# Patient Record
(demographics unavailable — no encounter records)

---

## 2024-10-22 NOTE — CARDIOLOGY SUMMARY
[de-identified] : ECG today showed sinus rhythm with a ventricular rate of 80 bpm. RI interval 146 ms and QRS duration of 84 ms. Normal axis and intervals. QTc 441 ms. No chamber enlargement or hypertrophy. No ST/T changes. [de-identified] : TTE W or WO Ultrasound Enhancing Agent (03.23.24 @ 08:48) CONCLUSIONS:  1. Left ventricular systolic function is normal with an ejection fraction of 56 % by Eckert's method of disks with an ejection fraction visually estimated at 55 to 60 %.  2. Normal right ventricular cavity size, with normal wall thickness, and normal systolic function.  3. Normal left and right atrial size.  4. No significant valvular disease.  5. No echocardiographic evidence of pulmonary hypertension.  6. No pericardial effusion seen. 7. No prior echocardiogram is available for comparison. [de-identified] : Coronary Angiography 3/2024 The coronary circulation is right dominant. Cardiac catheterization was performed electively. LM  Left main artery: The segment is visually normal in size and structure. Angiography shows no disease.  LAD  Left anterior descending artery: The segment is visually normal in size and structure. Angiography shows no disease.  CX  Circumflex: The segment is visually normal in size and structure. Angiography shows no disease.  RCA  Right coronary artery: The segment is visually normal in size and structure. Angiography shows no disease.

## 2024-10-22 NOTE — REASON FOR VISIT
[Symptom and Test Evaluation] : symptom and test evaluation [FreeTextEntry3] : Lizbeth Chatman MD- [address] [id number] [FreeTextEntry1] : History was provided by patient and chart review.

## 2024-10-22 NOTE — HISTORY OF PRESENT ILLNESS
[FreeTextEntry1] : Mr. Mj Odom was seen at the [facility] located in [place], [place], on 10/21/2024. The patient is a 48-year-old man with, systemic hypertension, lipid disorder, diabetes, premature coronary artery disease in a first-degree relative, SVT and palpitations/shortness of breath. He was referred specifically for these symptoms.   I had the opportunity of meeting Mj for the first time today in clinic. The patient was unaccompanied during the visit.   Mj was admitted to [facility] in 3/2024 with sudden onset chest pain/palpitations when he awoke from sleep at 0400, upon arrival in the ER EKG noted SVT  given IV adenosine and vagal manuevers converted to sinus tachycardia. 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 He was taken to the cath lab. Normal coronary arteries. Echo LVEF 55-60%. He was not sent home on an AV suleman blocker.   He denies fevers, chills, night sweats, fatigue, snoring, headache, vision disturbance, near syncope, syncope, lightheadedness, dysphagia, chest pressure, pedema, orthopnea, paroxysmal nocturnal dyspnea, edema, transient ischemic attack, stroke, bleeding issues, or mechanical falls. He continues to have palpitations and shortness of breath. He feels his heart racing multiple times per day.    No personal history of coronary artery disease, heart failure, myocarditis, pericarditis or valvular disease.   Other past medical and surgical history listed below.  Exercise: no

## 2024-10-22 NOTE — DISCUSSION/SUMMARY
[FreeTextEntry1] : Mr. Mj Odom is a 48-year-old man with 1. Systemic hypertension 2. Lipid disorder 3. Diabetes 4. Premature coronary artery disease in a first-degree relative 5. Prior SVT.  6. Palpitations.  7. Shortness of breath.   He was referred specifically for these symptoms of shortness of breath and palpitations. He has no angina. He has no evidence of acute decompensated HF.   Given he had a normal coronary angiogram in 3/2024, it would be futile to order a coronary CTA at this time. I do want to repeat an echocardiogram to assess his valves and pericardium.   I also want to refer him to pulmonary medicine and CPET as well.   I think a 7 day heart monitor is warranted to ensure there is no recurrence of his SVT that could explain his palpitations and shortness of breath.   If there are no abnormalities on the echo or heart monitor (and if there is no pulmonary concern) then we will have to consider additional tests for workup of his symptoms.   I recommended lifestyle modifications necessary for long-term cardiovascular health.    The patient is aware of alarm cardiac type symptoms, will call with questions or concerns and is aware to activate 9-1-1 and/or present to the closest emergency department if concerns arise.   I recommend follow up with me in 3 months. I will follow up on the echo and CPET.    The patient should continue to follow with their primary care physician. All questions were answered to the patient's satisfaction. Return precautions were given and the patient verbalized understanding, and is agreeable with plan of care. Thank you for the opportunity to participate in the care of Mj. Please do not hesitate to reach out to me with questions or concerns.   Duane Bynum DO, [facility] Cardio-Obstetrics Cardiologist Adult Congenital Heart Disease Cardiologist [facility], [address], [address] [place] [address] [EKG obtained to assist in diagnosis and management of assessed problem(s)] : EKG obtained to assist in diagnosis and management of assessed problem(s)

## 2024-10-22 NOTE — REVIEW OF SYSTEMS
[SOB] : shortness of breath [Palpitations] : palpitations [Depression] : no depression [Suicidal] : not suicidal [Negative] : Heme/Lymph

## 2024-10-22 NOTE — REASON FOR VISIT
[Symptom and Test Evaluation] : symptom and test evaluation [FreeTextEntry3] : Lizbeth Chatman MD- [address] [phone] [FreeTextEntry1] : History was provided by patient and chart review.

## 2024-10-22 NOTE — DISCUSSION/SUMMARY
[FreeTextEntry1] : Mr. Mj Odom is a 48-year-old man with 1. Systemic hypertension 2. Lipid disorder 3. Diabetes 4. Premature coronary artery disease in a first-degree relative 5. Prior SVT.  6. Palpitations.  7. Shortness of breath.   He was referred specifically for these symptoms of shortness of breath and palpitations. He has no angina. He has no evidence of acute decompensated HF.   Given he had a normal coronary angiogram in 3/2024, it would be futile to order a coronary CTA at this time. I do want to repeat an echocardiogram to assess his valves and pericardium.   I also want to refer him to pulmonary medicine and CPET as well.   I think a 7 day heart monitor is warranted to ensure there is no recurrence of his SVT that could explain his palpitations and shortness of breath.   If there are no abnormalities on the echo or heart monitor (and if there is no pulmonary concern) then we will have to consider additional tests for workup of his symptoms.   I recommended lifestyle modifications necessary for long-term cardiovascular health.    The patient is aware of alarm cardiac type symptoms, will call with questions or concerns and is aware to activate 9-1-1 and/or present to the closest emergency department if concerns arise.   I recommend follow up with me in 3 months. I will follow up on the echo and CPET.    The patient should continue to follow with their primary care physician. All questions were answered to the patient's satisfaction. Return precautions were given and the patient verbalized understanding, and is agreeable with plan of care. Thank you for the opportunity to participate in the care of Mj. Please do not hesitate to reach out to me with questions or concerns.   Duane Bynum DO, [facility] Cardio-Obstetrics Cardiologist Adult Congenital Heart Disease Cardiologist [facility], [address], [address] [place] [address] [EKG obtained to assist in diagnosis and management of assessed problem(s)] : EKG obtained to assist in diagnosis and management of assessed problem(s)

## 2024-10-22 NOTE — CARDIOLOGY SUMMARY
[de-identified] : ECG today showed sinus rhythm with a ventricular rate of 80 bpm. [place] interval 146 ms and QRS duration of 84 ms. Normal axis and intervals. QTc 441 ms. No chamber enlargement or hypertrophy. No ST/T changes. [de-identified] : TTE W or WO Ultrasound Enhancing Agent (03.23.24 @ 08:48) CONCLUSIONS:  1. Left ventricular systolic function is normal with an ejection fraction of 56 % by Eckert's method of disks with an ejection fraction visually estimated at 55 to 60 %.  2. Normal right ventricular cavity size, with normal wall thickness, and normal systolic function.  3. Normal left and right atrial size.  4. No significant valvular disease.  5. No echocardiographic evidence of pulmonary hypertension.  6. No pericardial effusion seen. 7. No prior echocardiogram is available for comparison. [de-identified] : Coronary Angiography 3/2024 The coronary circulation is right dominant. Cardiac catheterization was performed electively. LM  Left main artery: The segment is visually normal in size and structure. Angiography shows no disease.  LAD  Left anterior descending artery: The segment is visually normal in size and structure. Angiography shows no disease.  CX  Circumflex: The segment is visually normal in size and structure. Angiography shows no disease.  RCA  Right coronary artery: The segment is visually normal in size and structure. Angiography shows no disease.

## 2024-10-22 NOTE — HISTORY OF PRESENT ILLNESS
[FreeTextEntry1] : Mr. Mj Odom was seen at the [facility] located in [place], [place], on 10/21/2024. The patient is a 48-year-old man with, systemic hypertension, lipid disorder, diabetes, premature coronary artery disease in a first-degree relative, SVT and palpitations/shortness of breath. He was referred specifically for these symptoms.   I had the opportunity of meeting Mj for the first time today in clinic. The patient was unaccompanied during the visit.   Mj was admitted to [facility] in 3/2024 with sudden onset chest pain/palpitations when he awoke from sleep at 0400, upon arrival in the ER EKG noted SVT  given IV adenosine and vagal manuevers converted to sinus tachycardia. 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 He was taken to the cath lab. Normal coronary arteries. Echo LVEF 55-60%. He was not sent home on an AV suleman blocker.   He denies fevers, chills, night sweats, fatigue, snoring, headache, vision disturbance, near syncope, syncope, lightheadedness, dysphagia, chest pressure, pedema, orthopnea, paroxysmal nocturnal dyspnea, edema, transient ischemic attack, stroke, bleeding issues, or mechanical falls. He continues to have palpitations and shortness of breath. He feels his heart racing multiple times per day.    No personal history of coronary artery disease, heart failure, myocarditis, pericarditis or valvular disease.   Other past medical and surgical history listed below.  Exercise: no

## 2024-11-25 NOTE — ASSESSMENT
[FreeTextEntry1] : 47 yo man referred by cardiologists for evaluation of pulmonary status Primar is Dr Chatman  Translation by Christofer Known HT on valsartan, metoprolol, HLD on atorvastatin Diabetes on Farziga and metformin  He notes that he sometimes has difficulty taking a deep breath--however he does not appear to by dyspnea  Works as cook, unmarried He is a nonsmoker, used cocaine briefly 20+ years ago, no IVDA He does not exercise ****** The patient had been drinking 6-8 beers a day for many years , he has stopped within the past two months  Admitted to [facility] in [place] for SVT Cath at that time showed no evidence of CAD  Imp 47 yo man with history of HT, Diabetes and SVT in March 2024 Fairly heavy alcohol use until two months ago Nonsmoker with no pulmonary disease Suspect that tachycardia episode may have been related to alcohol abuse Doubt underlying pulmonary disease but will obtain full PFTs and blood work Return at time of his PFTs

## 2024-11-25 NOTE — HISTORY OF PRESENT ILLNESS
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

## 2025-01-13 NOTE — ASSESSMENT
[FreeTextEntry1] : 49 yo man with history of HT, Diabetes and SVT in March 2024 Fairly heavy alcohol use until two months ago Nonsmoker with no pulmonary disease Suspect that tachycardia episode may have been related to alcohol abuse Doubted underlying pulmonary disease but will obtain full PFTs and blood work  Interim: PAtient doing well No complaints of dyspena Blood work reviewed and it was noncontributory  PFTs today: No evidence of obstruction No evidence of restricted lung volumes or decrease in DLCO Normal PFTs  Imp 49 yo man with HT Diabetes and SVT and previous alcohol use Normal PFTs No further dyspnea No evidence of underlying pulmonary disease Will see again as requested

## 2025-01-13 NOTE — CONSULT LETTER
[Dear  ___] : Dear  [unfilled], [FreeTextEntry1] : I had the pleasure of evaluating your patient, SHARON HOLT , in the office today.  Please review my consultation and evaluation report that follows below.  Please do not hesitate to call me if further information is necessary or if you wish to discuss ongoing care or diagnostic work-up.    I very much appreciate your referral and it is a privilege to be able to provide care for your patient.  Sincerely,   Artem Narvaez MD, MHCM, FACP, ANA-C Pulmonary Medicine  of Medicine Mario abdulaziz Pedraza [facility] [employer] at \[facility]\""/[facility] [email or web address] -Pulmonary in [address] [place]  [address]    Fax   [facility] at [address]  [phone]

## 2025-01-13 NOTE — HISTORY OF PRESENT ILLNESS
[TextBox_4] : 49 yo man with history of HT, Diabetes and SVT in March 2024 Fairly heavy alcohol use until two months ago Nonsmoker with no pulmonary disease Suspect that tachycardia episode may have been related to alcohol abuse Doubted underlying pulmonary disease but will obtain full PFTs and blood work  Interim: PAtient doing well No complaints of dyspena Blood work reviewed and it was noncontributory  PFTs today: No evidence of obstruction No evidence of restricted lung volumes or decrease in DLCO Normal PFTs

## 2025-03-18 NOTE — PHYSICAL EXAM
[Well Developed] : well developed [Well Nourished] : well nourished [No Acute Distress] : no acute distress [Normal Conjunctiva] : normal conjunctiva [Normal Venous Pressure] : normal venous pressure [No Carotid Bruit] : no carotid bruit [Normal S1, S2] : normal S1, S2 [No Murmur] : no murmur [No Rub] : no rub [No Gallop] : no gallop [Clear Lung Fields] : clear lung fields [Good Air Entry] : good air entry [No Respiratory Distress] : no respiratory distress  [Soft] : abdomen soft [Non Tender] : non-tender [No Masses/organomegaly] : no masses/organomegaly [Normal Bowel Sounds] : normal bowel sounds [Normal Gait] : normal gait [No Edema] : no edema [No Cyanosis] : no cyanosis [No Clubbing] : no clubbing [No Varicosities] : no varicosities [No Rash] : no rash [No Skin Lesions] : no skin lesions [Moves all extremities] : moves all extremities [No Focal Deficits] : no focal deficits [Normal Speech] : normal speech [Alert and Oriented] : alert and oriented [Normal memory] : normal memory [Normal Rate] : normal [Rhythm Regular] : regular

## 2025-03-18 NOTE — CARDIOLOGY SUMMARY
[de-identified] : 3/18/2025 sinus rhythm with ventricular rate of 98 bpm. PA interval 144 ms and QRS duration of 81 ms. Normal axis and intervals. QTc 428 ms. No chamber enlargement or hypertrophy. No ST/T changes. [de-identified] : Notified that his heart monitor from 10.2024 never made it to my in basket. Patient had a min HR of 56 bpm, max HR of 155 bpm, and avg HR of 88 bpm. Predominant underlying rhythm was Sinus Rhythm. Isolated SVEs were rare (<1.0%), and no SVE Couplets or SVE Triplets were present. No Isolated VEs, VE Couplets, or VE Triplets were present. [de-identified] : TTE 03.23.24  1. Left ventricular systolic function is normal with an ejection fraction of 56 % by Eckert's method of disks with an ejection fraction visually estimated at 55 to 60 %.  2. Normal right ventricular cavity size, with normal wall thickness, and normal systolic function.  3. Normal left and right atrial size.  4. No significant valvular disease.  5. No echocardiographic evidence of pulmonary hypertension.  6. No pericardial effusion seen. 7. No prior echocardiogram is available for comparison.   [de-identified] : Coronary Angiography 3/2024 The coronary circulation is right dominant. Cardiac catheterization was performed electively. Left main artery: The segment is visually normal in size and structure. Angiography shows no disease. Left anterior descending artery: The segment is visually normal in size and structure. Angiography shows no disease. Circumflex: The segment is visually normal in size and structure. Angiography shows no disease. Right coronary artery: The segment is visually normal in size and structure. Angiography shows no disease.

## 2025-03-18 NOTE — REASON FOR VISIT
[CV Risk Factors and Non-Cardiac Disease] : CV risk factors and non-cardiac disease [FreeTextEntry3] : Lizbeth Chatman [FreeTextEntry1] : History was provided by patient and chart review. He was alone during the encounter.

## 2025-03-18 NOTE — HISTORY OF PRESENT ILLNESS
[FreeTextEntry1] : Mr. Mj Odom was seen at the [facility] located in [place], [place], on 3/18/2025. The patient is a 49-year-old man with systemic hypertension, lipid disorder, diabetes, premature coronary artery disease in a first-degree relative, SVT (AVNRT) and palpitations/shortness of breath. I last evaluated him on 10/21/2024. I recommended 3 month follow up with CPET and echo. I also referred him to pulmonary medicine. He was seen by Dr. Narvaez. He reported there were no pulmonary concerns.  Patient denies fevers, fatigue, snoring, lightheadedness, near syncope, syncope, headache, vision disturbance, chest pain, palpitations, dyspnea, edema, orthopnea, or PND. Family history: see below Social history: see below. He is a cook.  CV meds: ezetimibe 10 mg daily, olmesartan 5 mg daily

## 2025-03-18 NOTE — DISCUSSION/SUMMARY
[EKG obtained to assist in diagnosis and management of assessed problem(s)] : EKG obtained to assist in diagnosis and management of assessed problem(s) [FreeTextEntry1] : Mr. Mj Odom is a 49-year-old man with 1. Systemic hypertension. 2. Lipid disorder. 3. Diabetes. 4. Premature coronary artery disease in a first-degree relative. 5. Prior SVT (AVNRT).   - The patient appears to be doing well from a cardiovascular standpoint and denies angina. There is no evidence of acute decompensated heart failure or clinically significant arrhythmia or valvular heart disease at this time. NYHA Class I. His previous palpitations and dyspnea have resolved. The patient is a non-smoker. ECG sinus rhythm, no ST-T changes. TTE 3/2024 normal left ventricular systolic function with an ejection fraction of 55-60%, no valvular abnormalities, and no pericardial effusion. 3/2024 no CAD on the coronary angiogram. Heart monitor 10/2024 showed no atrial fibrillation or other forms of SVT.  I recommended lifestyle modifications necessary for long-term cardiovascular health.   - The patient is aware of alarm cardiac type symptoms, will call with questions or concerns and is aware to activate 9-1-1 and/or present to the closest emergency department if concerns arise.  - I recommend follow up with me PRN. He is completely asymptomatic and has no coronary heart disease, arrhythmias, or valvular heart disease that currently warrant long-term follow up at least at this point. I would be happy to see him in the future if he wishes.   - He should be on a statin since he has DM. He says he follows with you Dr. Chatman, every 3 months.   The patient should continue to follow with their primary care physician. All questions were answered to the patient's satisfaction. Return precautions were given and the patient verbalized understanding and is agreeable with plan of care. Thank you for the opportunity to participate in the care of Mj. Please do not hesitate to reach out to me with questions or concerns.  Duane Bynum DO, [facility] Cardio-Obstetrics Cardiologist Adult Congenital Heart Disease Cardiologist [facility], [address], [address] [place] [address].